# Patient Record
Sex: FEMALE | Race: WHITE | URBAN - METROPOLITAN AREA
[De-identification: names, ages, dates, MRNs, and addresses within clinical notes are randomized per-mention and may not be internally consistent; named-entity substitution may affect disease eponyms.]

---

## 2017-07-10 DIAGNOSIS — F31.9 BIPOLAR I DISORDER (H): ICD-10-CM

## 2017-07-10 NOTE — TELEPHONE ENCOUNTER
FLUoxetine (PROZAC) 20 MG     Last Written Prescription Date: 12/20/16  Last Fill Quantity: 90, # refills: 1  Last Office Visit with [facility] primary care provider:  12/20/16        Last PHQ-9 score on record=   PHQ-9 SCORE 8/15/2016   Total Score 0               lithium (ESKALITH) 450 MG      Last Written Prescription Date: 12/21/16  Last Fill Quantity: 180,  # refills: 1   Last Office Visit with [facility] or [facility] prescribing provider: 12/20/16

## 2017-07-11 RX ORDER — LITHIUM CARBONATE 450 MG
1350 TABLET, EXTENDED RELEASE ORAL DAILY
Qty: 180 TABLET | Refills: 1 | Status: SHIPPED | OUTPATIENT
Start: 2017-07-11 | End: 2017-07-12

## 2017-07-11 NOTE — TELEPHONE ENCOUNTER
Routing refill request to provider for review/approval because:  Drug not on the [facility] refill protocol for dx listed.  Nerissa Xie RN  [facility]  [phone]

## 2017-07-12 ENCOUNTER — TELEPHONE (OUTPATIENT)
Dept: FAMILY MEDICINE | Facility: CLINIC | Age: 42
End: 2017-07-12

## 2017-07-12 DIAGNOSIS — F31.9 BIPOLAR I DISORDER (H): ICD-10-CM

## 2017-07-12 RX ORDER — LITHIUM CARBONATE 450 MG
1350 TABLET, EXTENDED RELEASE ORAL DAILY
Qty: 270 TABLET | Refills: 1 | Status: SHIPPED | OUTPATIENT
Start: 2017-07-12 | End: 2018-10-15

## 2017-07-12 NOTE — TELEPHONE ENCOUNTER
[place] pharmacist at [facility] [phone] calling  wants to clarify the directions for the Lithium- the qty is for 180 but the directions are 3 a day.  This is not a 90 day supply- qty would be 270- just wants to verify if the directions are correct at 3 a day and if they are can we resend the script with a 90 qty      Thank You,    Nerissa GUO RN  [facility]  [phone]

## 2018-02-02 DIAGNOSIS — F31.9 BIPOLAR I DISORDER (H): ICD-10-CM

## 2018-02-02 NOTE — LETTER
[facility] [place]  [address]  [place] [address]  [phone]        February 13, 2018  Aleta Costa  [address] [address]  [place] [address]    Dear Aleta,    I care about your health and have reviewed your health plan. I have reviewed your medical conditions, medication list, and lab results and am making recommendations based on this review, to better manage your health.    You are in particular need of attention regarding:  -Depression    I am recommending that you:  -schedule a FOLLOWUP OFFICE APPOINTMENT     Here is a list of Health Maintenance topics that are due now or due soon:  Health Maintenance Due   Topic Date Due     Flu Vaccine - yearly  09/01/2017       Please call us at [phone] (or use [facility]) to address the above recommendations.     Thank you for trusting [facility] and we appreciate the opportunity to serve you.  We look forward to supporting your healthcare needs in the future.    Healthy Regards,    [facility]:

## 2018-02-02 NOTE — TELEPHONE ENCOUNTER
"Last Written Prescription Date:  7/11/17  Last Fill Quantity: 90,  # refills: 1   Last Office Visit with [facility] provider:  12/20/16   Future Office Visit:       Requested Prescriptions   Pending Prescriptions Disp Refills     FLUoxetine (PROZAC) 20 MG capsule [Pharmacy Med Name: FLUOXETINE HCL 20 MG CAPSULE] 90 capsule 1     Sig: TAKE 1 CAPSULE (20 MG) BY MOUTH DAILY    SSRIs Protocol Failed    2/2/2018  1:28 AM       Failed - Recent or future visit with authorizing provider    Patient had office visit in the last year or has a visit in the next 30 days with authorizing provider.  See \"Patient Info\" tab in inbasket, or \"Choose Columns\" in Meds & Orders section of the refill encounter.            Passed - Patient is age 18 or older       Passed - No active pregnancy on record       Passed - No positive pregnancy test in last 12 months        PHQ-9 SCORE 11/16/2015 8/15/2016   Total Score 6 0     Attempted to call the patient to update her PHQ9.  Pt did not answer and message states \"the mailbox is full and unable to accept new messages\"  Lisa Calixto RN  [facility]      "

## 2018-02-06 NOTE — TELEPHONE ENCOUNTER
Need OV as more than 1 year since last OV on 12/20/2016  Please call and assist with appt.  Thank you    Angeli Clark RN

## 2018-02-09 NOTE — TELEPHONE ENCOUNTER
2nd attempt to call pt.  Mailbox is still full.    Pt is due for an appointment with the provider.    Lisa Weber CMA

## 2018-09-24 ENCOUNTER — TELEPHONE (OUTPATIENT)
Dept: FAMILY MEDICINE | Facility: CLINIC | Age: 43
End: 2018-09-24

## 2018-09-24 DIAGNOSIS — F31.9 BIPOLAR I DISORDER (H): Primary | ICD-10-CM

## 2018-09-24 NOTE — TELEPHONE ENCOUNTER
Per patient, takes lithium and has an appointment scheduled for her physical and med check and states that she usually gets labs done to get her levels checked. She has set up a lab appt and is requesting a lab order so she can complete it.     She also has an abdominal plasty surgery coming up and her surgeon is requesting lab work to be done 7 days prior. She states that she will contact the surgeon requesting the labs to see if she can get an order, but she is asking if Dr. Vega can order those labs if at all possible. She does currently have an appt set.    Outreach ,  [place]

## 2018-09-24 NOTE — TELEPHONE ENCOUNTER
S/w pt and advised PCP did order lithium level to be drawn at a future date.    Pt states has lab appt on 10/8.    Rebecca Arenas RN  EP Triage

## 2018-09-24 NOTE — TELEPHONE ENCOUNTER
Per patient, has reached out to her surgeon and has orders for her labs already. She now only needs labs for her medication lithium levels.    Outreach ,  [place]

## 2018-10-15 ENCOUNTER — OFFICE VISIT (OUTPATIENT)
Dept: FAMILY MEDICINE | Facility: CLINIC | Age: 43
End: 2018-10-15
Payer: COMMERCIAL

## 2018-10-15 VITALS
BODY MASS INDEX: 27.92 KG/M2 | HEART RATE: 76 BPM | DIASTOLIC BLOOD PRESSURE: 63 MMHG | WEIGHT: 173 LBS | SYSTOLIC BLOOD PRESSURE: 90 MMHG | OXYGEN SATURATION: 97 % | TEMPERATURE: 98.4 F

## 2018-10-15 DIAGNOSIS — F31.9 BIPOLAR I DISORDER (H): ICD-10-CM

## 2018-10-15 DIAGNOSIS — Z00.00 ROUTINE HISTORY AND PHYSICAL EXAMINATION OF ADULT: Primary | ICD-10-CM

## 2018-10-15 DIAGNOSIS — Z23 NEED FOR PROPHYLACTIC VACCINATION AND INOCULATION AGAINST INFLUENZA: ICD-10-CM

## 2018-10-15 DIAGNOSIS — L05.91 PILONIDAL CYST: ICD-10-CM

## 2018-10-15 DIAGNOSIS — Z12.31 ENCOUNTER FOR SCREENING MAMMOGRAM FOR BREAST CANCER: ICD-10-CM

## 2018-10-15 LAB
ALBUMIN UR-MCNC: NEGATIVE MG/DL
APPEARANCE UR: NORMAL
BACTERIA #/AREA URNS HPF: ABNORMAL /HPF
BILIRUB UR QL STRIP: NEGATIVE
COLOR UR AUTO: YELLOW
ERYTHROCYTE [DISTWIDTH] IN BLOOD BY AUTOMATED COUNT: 13.8 % (ref 10–15)
GLUCOSE UR STRIP-MCNC: NEGATIVE MG/DL
HCT VFR BLD AUTO: 37.4 % (ref 35–47)
HGB BLD-MCNC: 12.2 G/DL (ref 11.7–15.7)
HGB UR QL STRIP: NEGATIVE
KETONES UR STRIP-MCNC: NEGATIVE MG/DL
LEUKOCYTE ESTERASE UR QL STRIP: NEGATIVE
MCH RBC QN AUTO: 28 PG (ref 26.5–33)
MCHC RBC AUTO-ENTMCNC: 32.6 G/DL (ref 31.5–36.5)
MCV RBC AUTO: 86 FL (ref 78–100)
NITRATE UR QL: NEGATIVE
NON-SQ EPI CELLS #/AREA URNS LPF: ABNORMAL /LPF
PH UR STRIP: 7 PH (ref 5–7)
PLATELET # BLD AUTO: 253 10E9/L (ref 150–450)
RBC # BLD AUTO: 4.36 10E12/L (ref 3.8–5.2)
RBC #/AREA URNS AUTO: ABNORMAL /HPF
SOURCE: NORMAL
SP GR UR STRIP: 1.01 (ref 1–1.03)
UROBILINOGEN UR STRIP-ACNC: 0.2 EU/DL (ref 0.2–1)
WBC # BLD AUTO: 6.2 10E9/L (ref 4–11)
WBC #/AREA URNS AUTO: ABNORMAL /HPF

## 2018-10-15 PROCEDURE — 80061 LIPID PANEL: CPT | Performed by: INTERNAL MEDICINE

## 2018-10-15 PROCEDURE — 80053 COMPREHEN METABOLIC PANEL: CPT | Performed by: INTERNAL MEDICINE

## 2018-10-15 PROCEDURE — G0145 SCR C/V CYTO,THINLAYER,RESCR: HCPCS | Performed by: INTERNAL MEDICINE

## 2018-10-15 PROCEDURE — 90686 IIV4 VACC NO PRSV 0.5 ML IM: CPT | Performed by: INTERNAL MEDICINE

## 2018-10-15 PROCEDURE — 36415 COLL VENOUS BLD VENIPUNCTURE: CPT | Performed by: INTERNAL MEDICINE

## 2018-10-15 PROCEDURE — 99213 OFFICE O/P EST LOW 20 MIN: CPT | Mod: 25 | Performed by: INTERNAL MEDICINE

## 2018-10-15 PROCEDURE — 85027 COMPLETE CBC AUTOMATED: CPT | Performed by: INTERNAL MEDICINE

## 2018-10-15 PROCEDURE — 81001 URINALYSIS AUTO W/SCOPE: CPT | Performed by: INTERNAL MEDICINE

## 2018-10-15 PROCEDURE — 90471 IMMUNIZATION ADMIN: CPT | Performed by: INTERNAL MEDICINE

## 2018-10-15 PROCEDURE — 99396 PREV VISIT EST AGE 40-64: CPT | Mod: 25 | Performed by: INTERNAL MEDICINE

## 2018-10-15 PROCEDURE — 87624 HPV HI-RISK TYP POOLED RSLT: CPT | Performed by: INTERNAL MEDICINE

## 2018-10-15 PROCEDURE — 84443 ASSAY THYROID STIM HORMONE: CPT | Performed by: INTERNAL MEDICINE

## 2018-10-15 RX ORDER — LITHIUM CARBONATE 450 MG
900 TABLET, EXTENDED RELEASE ORAL DAILY
Qty: 270 TABLET | Refills: 1
Start: 2018-10-15 | End: 2019-08-28

## 2018-10-15 RX ORDER — MULTIPLE VITAMINS W/ MINERALS TAB 9MG-400MCG
1 TAB ORAL DAILY
COMMUNITY

## 2018-10-15 NOTE — PROGRESS NOTES
SUBJECTIVE:   CC: Aleta Costa is an 43 year old woman who presents for preventive health visit.     Healthy Habits:    Do you get at least three servings of calcium containing foods daily (dairy, green leafy vegetables, etc.)? No     Amount of exercise or daily activities, outside of work: daily     Problems taking medications regularly no     Medication side effects: Yes  Watery stools , fatigue , very heavy cycle     Have you had an eye exam in the past two years? no    Do you see a dentist twice per year? yes    Do you have sleep apnea, excessive snoring or daytime drowsiness?no    Aleta is a 44 y/o female with Bipolar Disorder. Her mood has been very stable on Lithium and Prozac for several years. She had tried increasing her dose of lithium earlier this year but developed a lot of diarrhea. She has gone back down to 2 tablets (900 mg) and these symptoms have improved. She remembers that her psychiatrist in [place] who initially put her on these medications told her that they can have long-term health implications so she is concerned about this.     Also tells me that she occasionally experiences a cyst at the end of her tailbone. Sometimes gets inflamed and drains. This has bene present since she was a teenager.    Today's PHQ-2 Score: No flowsheet data found.    Abuse: Current or Past(Physical, Sexual or Emotional)- No  Do you feel safe in your environment - Yes    Social History   Substance Use Topics     Smoking status: Never Smoker     Smokeless tobacco: Never Used     Alcohol use No     If you drink alcohol do you typically have >3 drinks per day or >7 drinks per week? No                     Reviewed orders with patient.  Reviewed health maintenance and updated orders accordingly - Yes  BP Readings from Last 3 Encounters:   10/15/18 90/63   12/20/16 100/61   08/15/16 107/58    Wt Readings from Last 3 Encounters:   10/15/18 173 lb (78.5 kg)   12/20/16 175 lb (79.4 kg)   08/15/16 183 lb  (83 kg)                    Patient under age 50, mutual decision reflected in health maintenance.      Pertinent mammograms are reviewed under the imaging tab.  History of abnormal Pap smear: NO - age 30-65 PAP every 5 years with negative HPV co-testing recommended  PAP / HPV Latest Ref Rng & Units 11/16/2015   PAP - NIL   HPV 16 DNA NEG Negative   HPV 18 DNA NEG Negative   OTHER HR HPV NEG Negative     Reviewed and updated as needed this visit by clinical staff         Reviewed and updated as needed this visit by Provider         ROS:   ROS: 10 point ROS neg other than the symptoms noted above in the HPI.    OBJECTIVE:   BP 90/63  Pulse 76  Temp 98.4  F (36.9  C) (Tympanic)  Wt 173 lb (78.5 kg)  LMP 10/02/2018  SpO2 97%  BMI 27.92 kg/m2  EXAM:  GENERAL: healthy, alert and no distress  EYES: Eyes grossly normal to inspection, PERRL and conjunctivae and sclerae normal  HENT: ear canals and TM's normal, nose and mouth without ulcers or lesions  NECK: no adenopathy, no asymmetry, masses, or scars and thyroid normal to palpation  RESP: lungs clear to auscultation - no rales, rhonchi or wheezes  BREAST: normal without masses, tenderness or nipple discharge and no palpable axillary masses or adenopathy  CV: regular rate and rhythm, normal S1 S2, no S3 or S4, no murmur, click or rub, no peripheral edema and peripheral pulses strong  ABDOMEN: soft, nontender, no hepatosplenomegaly, no masses and bowel sounds normal  RECTAL: Pilonidal cyst in the gluteal crest  MS: no gross musculoskeletal defects noted, no edema  SKIN: no suspicious lesions or rashes  NEURO: Normal strength and tone, mentation intact and speech normal  PSYCH: mentation appears normal, affect normal/bright    Diagnostic Test Results:  none     ASSESSMENT/PLAN:   1. Routine history and physical examination of adult  - Pap imaged thin layer screen with HPV - recommended age 30 - 65 years (select HPV order below)  - HPV High Risk Types DNA Cervical  -  "Comprehensive metabolic panel  - CBC with platelets  - Lipid panel reflex to direct LDL Fasting  - Urine Microscopic    2. Bipolar I disorder (H)  Recommending that we continue lithium 900 mg daily since it has worked so well for her as a mood stabilizer.  We will check her thyroid level today as well as a urinalysis and sodium level.  - lithium (ESKALITH) 450 MG CR tablet; Take 2 tablets (900 mg) by mouth daily  Dispense: 270 tablet; Refill: 1  - TSH with free T4 reflex  - *UA reflex to Microscopic and Culture ([facility] (except [place] and [place])    3. Need for prophylactic vaccination and inoculation against influenza  - FLU VACCINE, SPLIT VIRUS, IM (QUADRIVALENT) [56191]- >3 YRS  - Vaccine Administration, Initial [36715]    4. Encounter for screening mammogram for breast cancer  - *MA Screening Digital Bilateral; Future    5. Pilonidal cyst  Discussed etiology and course of pilonidal cyst.  Ultimate treatment involves surgery.  She is not interested in this today but will consider.      COUNSELING:   Reviewed preventive health counseling, as reflected in patient instructions       Regular exercise       Healthy diet/nutrition       Osteoporosis Prevention/Bone Health       Colon cancer screening    BP Readings from Last 1 Encounters:   12/20/16 100/61     Estimated body mass index is 28.25 kg/(m^2) as calculated from the following:    Height as of 8/15/16: 5' 6\" (1.676 m).    Weight as of 12/20/16: 175 lb (79.4 kg).           reports that she has never smoked. She has never used smokeless tobacco.      Counseling Resources:  ATP IV Guidelines  Pooled Cohorts Equation Calculator  Breast Cancer Risk Calculator  FRAX Risk Assessment  ICSI Preventive Guidelines  Dietary Guidelines for Americans, 2010  USDA's MyPlate  ASA Prophylaxis  Lung CA Screening    Licha Vega MD  [facility] [place]    Injectable Influenza Immunization Documentation    1.  Is the person to be vaccinated " sick today?   No    2. Does the person to be vaccinated have an allergy to a component   of the vaccine?   No  Egg Allergy Algorithm Link    3. Has the person to be vaccinated ever had a serious reaction   to influenza vaccine in the past?   No    4. Has the person to be vaccinated ever had Guillain-Barré syndrome?   No    Form completed by Britany Sanchez MA

## 2018-10-15 NOTE — MR AVS SNAPSHOT
After Visit Summary   10/15/2018    Aleta Costa    MRN: [id number]           Patient Information     Date Of Birth          1975        Visit Information        Provider Department      10/15/2018 10:20 AM Licha Vega MD [facility]        Today's Diagnoses     Routine history and physical examination of adult    -  1    Bipolar I disorder (H)        Need for prophylactic vaccination and inoculation against influenza        Encounter for screening mammogram for breast cancer        Pilonidal cyst          Care Instructions      Preventive Health Recommendations  Female Ages 40 to 49    Yearly exam:     See your health care provider every year in order to  1. Review health changes.   2. Discuss preventive care.    3. Review your medicines if your doctor prescribed any.      Get a Pap test every three years (unless you have an abnormal result and your provider advises testing more often).      If you get Pap tests with HPV test, you only need to test every 5 years, unless you have an abnormal result. You do not need a Pap test if your uterus was removed (hysterectomy) and you have not had cancer.      You should be tested each year for STDs (sexually transmitted diseases), if you're at risk.     Ask your doctor if you should have a mammogram.      Have a colonoscopy (test for colon cancer) if someone in your family has had colon cancer or polyps before age 50.       Have a cholesterol test every 5 years.       Have a diabetes test (fasting glucose) after age 45. If you are at risk for diabetes, you should have this test every 3 years.    Shots: Get a flu shot each year. Get a tetanus shot every 10 years.     Nutrition:     Eat at least 5 servings of fruits and vegetables each day.    Eat whole-grain bread, whole-wheat pasta and brown rice instead of white grains and rice.    Get adequate Calcium and Vitamin D.      Lifestyle    Exercise at least 150 minutes a week  (an average of 30 minutes a day, 5 days a week). This will help you control your weight and prevent disease.    Limit alcohol to one drink per day.    No smoking.     Wear sunscreen to prevent skin cancer.    See your dentist every six months for an exam and cleaning.          Follow-ups after your visit        Follow-up notes from your care team     Return in about 6 months (around 4/15/2019) for Medication Follow up.      Your next 10 appointments already scheduled     Oct 17, 2018 10:45 AM CDT   MA SCREENING DIGITAL BILATERAL with ECMA1   [facility]    [address] [place] [address]   [phone]           How do I prepare for my exam? (Food and drink instructions) No Food and Drink Restrictions.  How do I prepare for my exam? (Other instructions) Do not use any powder, lotion or deodorant under your arms or on your breast. If you do, we will ask you to remove it before your exam.  What should I wear: Wear comfortable, two-piece clothing.  How long does the exam take: Most scans will take 15 minutes.  What should I bring: Bring any previous mammograms from other facilities or have them mailed to the breast center.  Do I need a :  No  is needed.  What do I need to tell my doctor: If you have any allergies, tell your care team.  What should I do after the exam: No restrictions, You may resume normal activities.  What is this test: This test is an x-ray of the breast to look for breast disease. The breast is pressed between two plates to flatten and spread the tissue. An X-ray is taken of the breast from different angles.  Who should I call with questions: If you have any questions, please call the Imaging Department where you will have your exam. Directions, parking instructions, and other information is available on our website, [email or web address].  Other information about my exam Three-dimensional (3D) mammograms are  "available at [place] locations in [place], [place], [place], [place], [facility], [place], [facility] and [place].  Health locations include [place] and the [facility] in [place].  Benefits of 3D mammograms include * Improved rate of cancer detection * Decreases your chance of having to go back for more tests, which means fewer: * \"False-positive\" results (This means that there is an abnormal area but it isn't cancer.) * Invasive testing procedures, such as a biopsy or surgery * Can provide clearer images of the breast if you have dense breast tissue.  *3D mammography is an optional exam that anyone can have with a 2D mammogram. It doesn't replace or take the place of a 2D mammogram. 2D mammograms remain an effective screening test for all women.  Not all insurance companies cover the cost of a 3D mammogram. Check with your insurance.            Nov 06, 2018 10:00 AM CST   Pre-Op physical with Oral Davis [facility]-C   [facility] ([address] [address]   [phone]            Nov 06, 2018 10:30 AM CST   LAB with [facility]   [facility] ([address] [address]   [phone]           OUTSIDE LABS: Please include name of facility and Physician that is requesting outside labs be drawn.  Please indicate if labs are fasting or non-fasting on appt notes.  Be as specific as you can on which labs are being drawn.              Future tests that were ordered for you today     Open Future Orders        Priority Expected Expires Ordered    *MA Screening Digital Bilateral Routine  10/15/2019 10/15/2018            Who to contact     If you have questions or need follow up information about today's clinic visit or your schedule please contact [facility] directly at [phone].  Normal or non-critical lab " "and imaging results will be communicated to you by MyChart, letter or phone within 4 business days after the clinic has received the results. If you do not hear from us within 7 days, please contact the clinic through [facility] or phone. If you have a critical or abnormal lab result, we will notify you by phone as soon as possible.  Submit refill requests through [employer] or call your pharmacy and they will forward the refill request to us. Please allow 3 business days for your refill to be completed.          Additional Information About Your Visit        [facility] Information     [employer] lets you send messages to your doctor, view your test results, renew your prescriptions, schedule appointments and more. To sign up, go to [email or web address] . Click on \"Log in\" on the left side of the screen, which will take you to the Welcome page. Then click on \"Sign up Now\" on the right side of the page.     You will be asked to enter the access code listed below, as well as some personal information. Please follow the directions to create your username and password.     Your access code is: [email or web address]  Expires: 2019 10:14 AM     Your access code will  in 90 days. If you need help or a new code, please call your [facility] clinic or [phone].        Care EveryWhere ID     This is your Care EveryWhere ID. This could be used by other organizations to access your [facility] medical records  [id number]        Your Vitals Were     Pulse Temperature Last Period Pulse Oximetry BMI (Body Mass Index)       76 98.4  F (36.9  C) (Tympanic) 10/02/2018 97% 27.92 kg/m2        Blood Pressure from Last 3 Encounters:   10/15/18 90/63   16 100/61   08/15/16 107/58    Weight from Last 3 Encounters:   10/15/18 173 lb (78.5 kg)   16 175 lb (79.4 kg)   08/15/16 183 lb (83 kg)              We Performed the Following     *UA reflex to Microscopic and Culture ([facility] (except [place] and [place])     " CBC with platelets     Comprehensive metabolic panel     FLU VACCINE, SPLIT VIRUS, IM (QUADRIVALENT) [47039]- >3 YRS     HPV High Risk Types DNA Cervical     Lipid panel reflex to direct LDL Fasting     Pap imaged thin layer screen with HPV - recommended age 30 - 65 years (select HPV order below)     TSH with free T4 reflex     Urine Microscopic     Vaccine Administration, Initial [57710]          Today's Medication Changes          These changes are accurate as of 10/15/18  1:17 PM.  If you have any questions, ask your nurse or doctor.               These medicines have changed or have updated prescriptions.        Dose/Directions    lithium 450 MG CR tablet   Commonly known as:  ESKALITH   This may have changed:  how much to take   Used for:  Bipolar I disorder (H)   Changed by:  Licha Vega MD        Dose:  900 mg   Take 2 tablets (900 mg) by mouth daily   Quantity:  270 tablet   Refills:  1            Where to get your medicines      Some of these will need a paper prescription and others can be bought over the counter.  Ask your nurse if you have questions.     You don't need a prescription for these medications     lithium 450 MG CR tablet                Primary Care Provider Office Phone # Fax #    Licha Vega [phone]       [address] [place] [address]        Equal Access to Services     NA PÉREZ AH: Hadclaudy vilao Soomaali, waaxda luqadaha, qaybta kaalmada adeegyada, kallie avendano. So [facility] [phone].    ATENCIÓN: Si habla español, tiene a majano disposición servicios gratuitos de asistencia lingüística. Llame al [phone].    We comply with applicable federal civil rights laws and [place] laws. We do not discriminate on the basis of race, color, national origin, age, disability, sex, sexual orientation, or gender identity.            Thank you!     Thank you for choosing [facility]  for your care.  Our goal is always to provide you with excellent care. Hearing back from our patients is one way we can continue to improve our services. Please take a few minutes to complete the written survey that you may receive in the mail after your visit with us. Thank you!             Your Updated Medication List - Protect others around you: Learn how to safely use, store and throw away your medicines at www.disposemymeds.org.          This list is accurate as of 10/15/18  1:17 PM.  Always use your most recent med list.                   Brand Name Dispense Instructions for use Diagnosis    FLUoxetine 20 MG capsule    PROzac    90 capsule    Take 1 capsule (20 mg) by mouth daily    Bipolar I disorder (H)       lithium 450 MG CR tablet    ESKALITH    270 tablet    Take 2 tablets (900 mg) by mouth daily    Bipolar I disorder (H)       Multi-vitamin Tabs tablet      Take 1 tablet by mouth daily

## 2018-10-15 NOTE — LETTER
October 26, 2018    Aleta Costa  [address] [place] [address]    Dear Aleta,  We are happy to inform you that your PAP smear result from 10/15/18 is normal.  We are now able to do a follow up test on PAP smears. The DNA test is for HPV (Human Papilloma Virus). Cervical cancer is closely linked with certain types of HPV. Your results showed no evidence of high risk HPV.  Therefore we recommend you return in 5 years for your next pap smear and HPV test.  You will still need to return to the clinic every year for an annual exam and other preventive tests.  If you have additional questions regarding this result, please call our registered nurse, Kelly at [phone].  Sincerely,    Licha Vega MD/[facility]

## 2018-10-16 LAB
ALBUMIN SERPL-MCNC: 4 G/DL (ref 3.4–5)
ALP SERPL-CCNC: 44 U/L (ref 40–150)
ALT SERPL W P-5'-P-CCNC: 20 U/L (ref 0–50)
ANION GAP SERPL CALCULATED.3IONS-SCNC: 4 MMOL/L (ref 3–14)
AST SERPL W P-5'-P-CCNC: 12 U/L (ref 0–45)
BILIRUB SERPL-MCNC: 0.4 MG/DL (ref 0.2–1.3)
BUN SERPL-MCNC: 10 MG/DL (ref 7–30)
CALCIUM SERPL-MCNC: 8.4 MG/DL (ref 8.5–10.1)
CHLORIDE SERPL-SCNC: 105 MMOL/L (ref 94–109)
CHOLEST SERPL-MCNC: 208 MG/DL
CO2 SERPL-SCNC: 28 MMOL/L (ref 20–32)
CREAT SERPL-MCNC: 0.69 MG/DL (ref 0.52–1.04)
GFR SERPL CREATININE-BSD FRML MDRD: >90 ML/MIN/1.7M2
GLUCOSE SERPL-MCNC: 95 MG/DL (ref 70–99)
HDLC SERPL-MCNC: 71 MG/DL
LDLC SERPL CALC-MCNC: 123 MG/DL
NONHDLC SERPL-MCNC: 137 MG/DL
POTASSIUM SERPL-SCNC: 3.9 MMOL/L (ref 3.4–5.3)
PROT SERPL-MCNC: 7.1 G/DL (ref 6.8–8.8)
SODIUM SERPL-SCNC: 137 MMOL/L (ref 133–144)
TRIGL SERPL-MCNC: 72 MG/DL
TSH SERPL DL<=0.005 MIU/L-ACNC: 1.03 MU/L (ref 0.4–4)

## 2018-10-17 LAB
COPATH REPORT: NORMAL
PAP: NORMAL

## 2018-10-18 LAB
FINAL DIAGNOSIS: NORMAL
HPV HR 12 DNA CVX QL NAA+PROBE: NEGATIVE
HPV16 DNA SPEC QL NAA+PROBE: NEGATIVE
HPV18 DNA SPEC QL NAA+PROBE: NEGATIVE
SPECIMEN DESCRIPTION: NORMAL
SPECIMEN SOURCE CVX/VAG CYTO: NORMAL

## 2018-11-06 ENCOUNTER — OFFICE VISIT (OUTPATIENT)
Dept: FAMILY MEDICINE | Facility: CLINIC | Age: 43
End: 2018-11-06
Payer: COMMERCIAL

## 2018-11-06 VITALS
OXYGEN SATURATION: 96 % | DIASTOLIC BLOOD PRESSURE: 66 MMHG | HEART RATE: 64 BPM | HEIGHT: 65 IN | WEIGHT: 167 LBS | BODY MASS INDEX: 27.82 KG/M2 | TEMPERATURE: 98.3 F | SYSTOLIC BLOOD PRESSURE: 86 MMHG

## 2018-11-06 DIAGNOSIS — F31.9 BIPOLAR I DISORDER (H): ICD-10-CM

## 2018-11-06 DIAGNOSIS — Z01.818 PREOP GENERAL PHYSICAL EXAM: Primary | ICD-10-CM

## 2018-11-06 LAB — HGB BLD-MCNC: 13.4 G/DL (ref 11.7–15.7)

## 2018-11-06 PROCEDURE — 36415 COLL VENOUS BLD VENIPUNCTURE: CPT | Performed by: PHYSICIAN ASSISTANT

## 2018-11-06 PROCEDURE — 85018 HEMOGLOBIN: CPT | Performed by: PHYSICIAN ASSISTANT

## 2018-11-06 PROCEDURE — 99214 OFFICE O/P EST MOD 30 MIN: CPT | Performed by: PHYSICIAN ASSISTANT

## 2018-11-06 NOTE — PROGRESS NOTES
[facility] [place]  [address] [facility] [address]  [phone]  Dept: [phone]    PRE-OP EVALUATION:  Today's date: 2018    Aleta Costa (: 1975) presents for pre-operative evaluation assessment as requested by Dr. Roshan kruger   She requires evaluation and anesthesia risk assessment prior to undergoing surgery/procedure for treatment of abdominoplasty, lipo, mastopexy     Proposed Surgery/ Procedure: abdominoplasty, lipo, mastopexy   Date of Surgery/ Procedure: 2018  Time of Surgery/ Procedure: 8 am   Hospital/Surgical Facility: [place] [facility]   Fax number for surgical facility: [phone]   Primary Physician: Licha Vega  Type of Anesthesia Anticipated: General    Patient has a Health Care Directive or Living Will:  NO    1. NO - Do you have a history of heart attack, stroke, stent, bypass or surgery on an artery in the head, neck, heart or legs?  2. NO - Do you ever have any pain or discomfort in your chest?  3. NO - Do you have a history of  Heart Failure?  4. NO - Are you troubled by shortness of breath when: walking on the level, up a slight hill or at night?  5. NO - Do you currently have a cold, bronchitis or other respiratory infection?  6. NO - Do you have a cough, shortness of breath or wheezing?  7. NO - Do you sometimes get pains in the calves of your legs when you walk?  8. NO - Do you or anyone in your family have previous history of blood clots?  9. NO - Do you or does anyone in your family have a serious bleeding problem such as prolonged bleeding following surgeries or cuts?  10. NO - Have you ever had problems with anemia or been told to take iron pills?  11. NO - Have you had any abnormal blood loss such as black, tarry or bloody stools, or abnormal vaginal bleeding?  12. NO - Have you ever had a blood transfusion?  13. NO - Have you or any of your relatives ever had problems with anesthesia?  14. NO - Do you have  sleep apnea, excessive snoring or daytime drowsiness?  15. NO - Do you have any prosthetic heart valves?  16. NO - Do you have prosthetic joints?  17. NO - Is there any chance that you may be pregnant?      HPI:     HPI related to upcoming procedure: Aleta presents to the clinic for preoperative examination prior to liposuction, tummy tuck and mastopexy procedure she is having done for cosmetic effect. She is feeling well today, no other concerns.       See problem list for active medical problems.  Problems all longstanding and stable, except as noted/documented.  See ROS for pertinent symptoms related to these conditions.                                                                                                                                                          .    MEDICAL HISTORY:     Patient Active Problem List    Diagnosis Date Noted     Pilonidal cyst 10/15/2018     Priority: Medium     Bipolar I disorder (H) 08/15/2016     Priority: Medium     Bipolar disorder (H) 2015     Priority: Medium      Past Medical History:   Diagnosis Date     Bipolar disorder (H)     psych MD in [place]-still following with him     Colitis due to Clostridium difficile 10/13    during inpt tx for pneumonia     Pneumonia due to Streptococcus (H) 10/13    left     Past Surgical History:   Procedure Laterality Date      SECTION      x3      SECTION, TUBAL LIGATION, COMBINED N/A 2015    Procedure: COMBINED  SECTION, TUBAL LIGATION;  Surgeon: Ilsa Mckinley MD;  Location: [facility] OR     CHOLECYSTECTOMY      24 wks of third preg for acute cholecystitis     CYSTECTOMY OVARIAN BENIGN  3/15/11    after  3rd preg. the ovaries were actually found to be normal but had extensive adhesions and there was an 8cm peritoneal inclusion cyst that was adherent to her left ovary that was removed but not a cyst on the ovaries themselves     DILATION AND CURETTAGE SUCTION  04    for AB 7-8 wks      "TUBAL LIGATION      done at time of 3rd c/s but failed. per outside op report had excision of fimbriated end of tube     Current Outpatient Prescriptions   Medication Sig Dispense Refill     FLUoxetine (PROZAC) 20 MG capsule Take 1 capsule (20 mg) by mouth daily 90 capsule 1     lithium (ESKALITH) 450 MG CR tablet Take 2 tablets (900 mg) by mouth daily 270 tablet 1     multivitamin, therapeutic with minerals (MULTI-VITAMIN) TABS tablet Take 1 tablet by mouth daily       OTC products: None, except as noted above    No Known Allergies   Latex Allergy: NO    Social History   Substance Use Topics     Smoking status: Never Smoker     Smokeless tobacco: Never Used     Alcohol use No     History   Drug Use No       REVIEW OF SYSTEMS:   CONSTITUTIONAL: NEGATIVE for fever, chills, change in weight  INTEGUMENTARY/SKIN: NEGATIVE for worrisome rashes, moles or lesions  EYES: NEGATIVE for vision changes or irritation  ENT/MOUTH: NEGATIVE for ear, mouth and throat problems  RESP: NEGATIVE for significant cough or SOB  BREAST: NEGATIVE for masses, tenderness or discharge  CV: NEGATIVE for chest pain, palpitations or peripheral edema  GI: NEGATIVE for nausea, abdominal pain, heartburn, or change in bowel habits  : NEGATIVE for frequency, dysuria, or hematuria  MUSCULOSKELETAL: NEGATIVE for significant arthralgias or myalgia  NEURO: NEGATIVE for weakness, dizziness or paresthesias  ENDOCRINE: NEGATIVE for temperature intolerance, skin/hair changes  HEME: NEGATIVE for bleeding problems  PSYCHIATRIC: NEGATIVE for changes in mood or affect    EXAM:   Ht 5' 5\" (1.651 m)  Wt 167 lb (75.8 kg)  LMP 10/29/2018  BMI 27.79 kg/m2    GENERAL APPEARANCE: healthy, alert and no distress     EYES: EOMI, PERRL     HENT: ear canals and TM's normal and nose and mouth without ulcers or lesions     NECK: no adenopathy, no asymmetry, masses, or scars and thyroid normal to palpation     RESP: lungs clear to auscultation - no rales, rhonchi or " wheezes     CV: regular rates and rhythm, normal S1 S2, no S3 or S4 and no murmur, click or rub     ABDOMEN:  soft, nontender, no HSM or masses and bowel sounds normal     MS: extremities normal- no gross deformities noted, no evidence of inflammation in joints, FROM in all extremities.     SKIN: no suspicious lesions or rashes     NEURO: Normal strength and tone, sensory exam grossly normal, mentation intact and speech normal     PSYCH: mentation appears normal. and affect normal/bright    DIAGNOSTICS:   Hemoglobin (indicated for history of anemia or procedure with significant blood loss such as tonsillectomy, major intraperitoneal surgery, vascular surgery, major spine surgery, total joint replacement)    Recent Labs   Lab Test  10/15/18   1113  12/20/16   1134  03/24/15   HGB  12.2  13.4   < >   --    PLT  253  237   < >   --    NA  137  138   --    --    POTASSIUM  3.9  4.2   < >   --    CR  0.69  0.66   < >   --    A1C   --    --    --   5.6    < > = values in this interval not displayed.      Results for orders placed or performed in visit on 11/06/18   Hemoglobin   Result Value Ref Range    Hemoglobin 13.4 11.7 - 15.7 g/dL     IMPRESSION:   Reason for surgery/procedure: Cosmetic surgery  iagnosis/reason for consult:  Preoperative examination    The proposed surgical procedure is considered INTERMEDIATE risk.    REVISED CARDIAC RISK INDEX  The patient has the following serious cardiovascular risks for perioperative complications such as (MI, PE, VFib and 3  AV Block):  No serious cardiac risks  INTERPRETATION: 0 risks: Class I (very low risk - 0.4% complication rate)    The patient has the following additional risks for perioperative complications:  No identified additional risks        1. Preop general physical exam  - Hemoglobin    2. Bipolar I disorder (H)  Controlled with Prozac and lithium      RECOMMENDATIONS:           APPROVAL GIVEN to proceed with proposed procedure, without further diagnostic  evaluation       Signed Electronically by: Oral Davis PA-C    Copy of this evaluation report is provided to requesting physician.    [facility] Preop Guidelines    Revised Cardiac Risk Index    Addendum:  I have reviewed the above document, agree with the assessment and plan as outlined.  Optimized for the planned procedure  Aba Pulliam MD  11/6/2018

## 2018-11-06 NOTE — PROGRESS NOTES
Faxed preop report to Hospital/Surgical Facility: [facility]   Fax number for surgical facility: [phone]  on November 6, 2018.  Mabel Gore TC

## 2018-11-06 NOTE — MR AVS SNAPSHOT
After Visit Summary   11/6/2018    Aleta Costa    MRN: [id number]           Patient Information     Date Of Birth          1975        Visit Information        Provider Department      11/6/2018 10:00 AM Oral Davis PA-C [facility]        Today's Diagnoses     Preop general physical exam    -  1      Care Instructions      Before Your Surgery      Call your surgeon if there is any change in your health. This includes signs of a cold or flu (such as a sore throat, runny nose, cough, rash or fever).    Do not smoke, drink alcohol or take over the counter medicine (unless your surgeon or primary care doctor tells you to) for the 24 hours before and after surgery.    If you take prescribed drugs: Follow your doctor s orders about which medicines to take and which to stop until after surgery.    Eating and drinking prior to surgery: follow the instructions from your surgeon    Take a shower or bath the night before surgery. Use the soap your surgeon gave you to gently clean your skin. If you do not have soap from your surgeon, use your regular soap. Do not shave or scrub the surgery site.  Wear clean pajamas and have clean sheets on your bed.           Follow-ups after your visit        Follow-up notes from your care team     Return in about 1 year (around 11/6/2019) for Physical Exam.      Who to contact     If you have questions or need follow up information about today's clinic visit or your schedule please contact [facility] directly at [phone].  Normal or non-critical lab and imaging results will be communicated to you by MyChart, letter or phone within 4 business days after the clinic has received the results. If you do not hear from us within 7 days, please contact the clinic through MyChart or phone. If you have a critical or abnormal lab result, we will notify you by phone as soon as possible.  Submit refill requests through  "MyChart or call your pharmacy and they will forward the refill request to us. Please allow 3 business days for your refill to be completed.          Additional Information About Your Visit        MyChart Information     [employer] lets you send messages to your doctor, view your test results, renew your prescriptions, schedule appointments and more. To sign up, go to [email or web address] . Click on \"Log in\" on the left side of the screen, which will take you to the Welcome page. Then click on \"Sign up Now\" on the right side of the page.     You will be asked to enter the access code listed below, as well as some personal information. Please follow the directions to create your username and password.     Your access code is: [email or web address]  Expires: 2019  9:14 AM     Your access code will  in 90 days. If you need help or a new code, please call your [facility] clinic or [phone].        Care EveryWhere ID     This is your Care EveryWhere ID. This could be used by other organizations to access your [facility] medical records  [id number]        Your Vitals Were     Pulse Temperature Height Last Period Pulse Oximetry BMI (Body Mass Index)    64 98.3  F (36.8  C) (Oral) 5' 5\" (1.651 m) 10/29/2018 96% 27.79 kg/m2       Blood Pressure from Last 3 Encounters:   18 (!) 86/66   10/15/18 90/63   16 100/61    Weight from Last 3 Encounters:   18 167 lb (75.8 kg)   10/15/18 173 lb (78.5 kg)   16 175 lb (79.4 kg)              We Performed the Following     Hemoglobin        Primary Care Provider Office Phone # Fax #    Licha Vega [phone]       [address] [facility] [address]        Equal Access to Services     [facility] BETO AH: Maria Fernanda Arciniega, waaxda luqadaha, qaybta kaalsharlene dunn, kallie sewell . [facility] [phone].    ATENCIÓN: Si habla español, tiene a majano disposición servicios gratuitos de asistencia " lingüística. Jaki al [phone].    We comply with applicable federal civil rights laws and [place] laws. We do not discriminate on the basis of race, color, national origin, age, disability, sex, sexual orientation, or gender identity.            Thank you!     Thank you for choosing [facility]  for your care. Our goal is always to provide you with excellent care. Hearing back from our patients is one way we can continue to improve our services. Please take a few minutes to complete the written survey that you may receive in the mail after your visit with us. Thank you!             Your Updated Medication List - Protect others around you: Learn how to safely use, store and throw away your medicines at www.disposemymeds.org.          This list is accurate as of 11/6/18 10:35 AM.  Always use your most recent med list.                   Brand Name Dispense Instructions for use Diagnosis    FLUoxetine 20 MG capsule    PROzac    90 capsule    Take 1 capsule (20 mg) by mouth daily    Bipolar I disorder (H)       lithium 450 MG CR tablet    ESKALITH    270 tablet    Take 2 tablets (900 mg) by mouth daily    Bipolar I disorder (H)       Multi-vitamin Tabs tablet      Take 1 tablet by mouth daily

## 2018-11-06 NOTE — LETTER
November 6, 2018      Aleta Costa  [address]  [place] [address]        Dear MsGloriaOlivierchristian,    We are writing to inform you of your test results.      -Hemoglobin is normal.  There is no evidence of anemia.    Resulted Orders   Hemoglobin   Result Value Ref Range    Hemoglobin 13.4 11.7 - 15.7 g/dL       If you have any questions or concerns, please call the clinic at the number listed above.       Sincerely,        Oral Davis PA-C

## 2018-11-06 NOTE — NURSING NOTE
preop fax number given was incorrect  TC called and got the correct fax number and refaxed to [phone]  Mabel LAWSON

## 2018-11-07 ENCOUNTER — TELEPHONE (OUTPATIENT)
Dept: FAMILY MEDICINE | Facility: CLINIC | Age: 43
End: 2018-11-07

## 2018-11-07 NOTE — TELEPHONE ENCOUNTER
Triny calling from [facility] regarding low BP from pre op. Given previous 2 BP readings from flow sheet.  Triny states thatr she will discuss with anesthesiologist and call back if any follow up needed.   Yudy Ash RN

## 2019-07-18 ENCOUNTER — TELEPHONE (OUTPATIENT)
Dept: OBGYN | Facility: CLINIC | Age: 44
End: 2019-07-18

## 2019-07-18 NOTE — TELEPHONE ENCOUNTER
Patient called today.    Patient was seen with Ilsa Mckinley MD at the [facility] in 2015.    She would like to re-establish new care and get a physical with this provider again.    Please contact patient.    Thank you.    Central Scheduling  Xiomara DUNCAN

## 2019-08-27 DIAGNOSIS — F31.9 BIPOLAR I DISORDER (H): ICD-10-CM

## 2019-08-27 NOTE — TELEPHONE ENCOUNTER
Reason for Call:  Medication or medication refill:    Do you use a [facility]?  Name of the pharmacy and phone number for the current request:    [id number] - [place], [place] - [address] [facility]. AT [address] OF [facility] [address]         Name of the medication requested: lithium (ESKALITH) 450 MG CR tablet, FLUoxetine (PROZAC) 20 MG capsule    Other request: I made gabrielle incase she need to see the DrGloria In order to refill this Med's. But per pt she need it ASAP since she is out of it.  If no need to see the Dr. pls let the pt know so that she can cancel it.    Can we leave a detailed message on this number? YES    Phone number patient can be reached at: Cell number on file:    Telephone Information:   Mobile [phone]       Best Time: Anytime     Call taken on 8/27/2019 at 4:00 PM by GREGORY ABDI

## 2019-08-28 RX ORDER — LITHIUM CARBONATE 450 MG
900 TABLET, EXTENDED RELEASE ORAL DAILY
Qty: 270 TABLET | Refills: 1 | Status: SHIPPED | OUTPATIENT
Start: 2019-08-28 | End: 2020-06-08

## 2019-08-28 NOTE — TELEPHONE ENCOUNTER
"Requested Prescriptions   Pending Prescriptions Disp Refills     lithium (ESKALITH) 450 MG CR tablet 270 tablet 1     Sig: Take 2 tablets (900 mg) by mouth daily       There is no refill protocol information for this order   Last Written Prescription Date:  10/15/18  Last Fill Quantity: 270,  # refills: 1   Last office visit: 11/6/2018 with prescribing provider:  Oral Davis PA-C   Future Office Visit:   Next 5 appointments (look out 90 days)    Sep 11, 2019  3:40 PM CDT  PHYSICAL with Ilsa Mckinley MD  [facility] ([facility]) [address] [address]  [phone]   Sep 25, 2019  4:00 PM CDT  Office Visit with Licha Vega [place]  [facility] [place] [id number] [phone]              FLUoxetine (PROZAC) 20 MG capsule 90 capsule 1     Sig: Take 1 capsule (20 mg) by mouth daily       SSRIs Protocol Passed - 8/28/2019 11:05 AM        Passed - Recent (12 mo) or future (30 days) visit within the authorizing provider's specialty     Patient had office visit in the last 12 months or has a visit in the next 30 days with authorizing provider or within the authorizing provider's specialty.  See \"Patient Info\" tab in inbasket, or \"Choose Columns\" in Meds & Orders section of the refill encounter.              Passed - Medication is active on med list        Passed - Patient is age 18 or older        Passed - No active pregnancy on record        Passed - No positive pregnancy test in last 12 months        Last Written Prescription Date:  7/11/17  Last Fill Quantity: 90,  # refills: 1   Last office visit: 11/6/2018 with prescribing provider:  Oral Davis PA-C  Routing refill request to provider for review/approval because:  Drug not on the [facility] refill protocol - lithium  A break in medication - fluoxetine  LOV with Dr. Vega was 10/15/18 which is date of last lithium " refill.          Future Office Visit:   Next 5 appointments (look out 90 days)    Sep 11, 2019  3:40 PM CDT  PHYSICAL with Ilsa Mckinley MD  [facility] ([facility]) [address] [place] [id number]  [phone]   Sep 25, 2019  4:00 PM CDT  Office Visit with Licha Vega MD  [facility] [place] [address] [place] [id number]  [phone]

## 2019-09-09 NOTE — PROGRESS NOTES
"Aleta is a 44 year old  female who presents for annual exam.     Besides routine health maintenance, she has no other health concerns today .    HPI:  The patient's PCP is Licha Vega MD.    Patient is doing very well. Hasn't been in since her PP visit 4 yrs ago. Has been seeing Dr. Vega for her PCP and getting labs with her and getting her psych meds from her as well. Just recently had fasting labs 11 months ago and normal.    Hadn't had a mammogram in many years but got one today.    Patient has had a tummy tuck and breast lift since last here. Really hard recovery but now so happy she did it. Feeling stronger than she has in her whole life. Running and doing a marathon coming up in a month or so. Doing a class at [facility] and yoga, meditating, reading a lot, etc. Feeling healthy    Does want to find a therapist. Her son, 14 yrs old and 8th grade, recently had a suicide attempt. Was in patient and now is doing much better, has a great therapist. Lots of guilt that she didn't see it and didn't know he was struggling other than maybe his grades were slipping just a bit but nothing more than that. Thinks talking to someone could really helps. [facility] is going to find someone for her. Has some mild hypomania but not full cam[place]. Mostly just depression at times but nothing recent.    Only issue is having really heavy periods and heavier than they ever have been. Totally regular. No vasomotor sx. Day 2-3 is a super tampon every 1-2 hours, overnight, doesn't want to go anywhere or leave the house. Has gotten progressively worse since having alejandro 4 yrs ago. Doesn't want anything hormonal that could interfere with her moods or her psych meds. Doesn't want to \"take anything\" since has a tubal that failed followed by  needing a vas.    Oldest is 18 and just went to [employer]. Going to nursing program. Son is 14 and next daughter is 9/10 in 4th grade and alejandro is 4. 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      GYNECOLOGIC HISTORY:    Patient's last menstrual period was 2019 (exact date).  Her current contraception method is: vasectomy and tubal ligation.  She  reports that she has never smoked. She has never used smokeless tobacco.    Patient is sexually active.  STD testing offered?  Declined  Last PHQ-9 score on record =   PHQ-9 SCORE 2019   PHQ-9 Total Score 0     Last GAD7 score on record =   DESI-7 SCORE 2019   Total Score 0     Alcohol Score = 1    HEALTH MAINTENANCE:  Cholesterol:   Recent Labs   Lab Test 10/15/18  1113   CHOL 208*   HDL 71   *   TRIG 72   GLUCOSE 95     TSH   Date Value Ref Range Status   10/15/2018 1.03 0.40 - 4.00 mU/L Final      Last Mammo: today,   Pap: HPV: negative  Lab Results   Component Value Date    PAP NIL 10/15/2018    PAP NIL 2015     Colonoscopy:  never, Next Colonoscopy: age 50.  Dexa:  never    Health maintenance updated:  yes    HISTORY:  OB History    Para Term  AB Living   5 4 4 0 1 4   SAB TAB Ectopic Multiple Live Births   1 0 0 0 4      # Outcome Date GA Lbr Kenrick/2nd Weight Sex Delivery Anes PTL Lv   5 Term 09/29/15 38w3d  3.779 kg (8 lb 5.3 oz) F CS-LTranv Gen        Birth Comments: had distal salpingectomy in [place] with her third and conceived. tx care to [facility] during 4th preg after move. planned repeat c/s wt general b/c suspicion for accreta but normal placenta      Name: Ihwot      Apgar1: 6  Apgar5: 9   4 Term 11/15/09 37w0d   F CS-Unspec   ANA      Birth Comments: PTL 34-36 wks and then repeat c/s at 36wks per pt. however notes reviewed and EDC was  and delivered  b/c couldn't stop ctx for 12 hours. had BTL this preg but failed      Name: Paula   3 Term 05 37w0d  3.941 kg (8 lb 11 oz) M CS-Unspec   ANA      Birth Comments: maybe had polyhydramnios with this preg and  question of that leading to PTL in early third trimester. on meds to stop PTL but then full labor at 36 wks so repeat c/s done..per pt. however op note reviewed and EDC was  and delivered  so was 37-38 wk      Name: Sai Gomez Term 01 39w0d  3.742 kg (8 lb 4 oz) F CS-Unspec   ANA      Birth Comments: c/s for arrest of dilation at 5cm after PROM with pit aug for over 4 hrs stalled at 5      Name: Maris Seals SAB                Patient Active Problem List   Diagnosis     Bipolar disorder (H)     Bipolar I disorder (H)     Pilonidal cyst     Past Surgical History:   Procedure Laterality Date     ABDOMINOPLASTY        SECTION      x3      SECTION, TUBAL LIGATION, COMBINED N/A 2015    Procedure: COMBINED  SECTION, TUBAL LIGATION;  Surgeon: Ilsa Mckinley MD;  Location: [facility] OR     CHOLECYSTECTOMY      24 wks of third preg for acute cholecystitis     CYSTECTOMY OVARIAN BENIGN  3/15/11    after  3rd preg. the ovaries were actually found to be normal but had extensive adhesions and there was an 8cm peritoneal inclusion cyst that was adherent to her left ovary that was removed but not a cyst on the ovaries themselves     DILATION AND CURETTAGE SUCTION  04    for AB 7-8 wks     HC REDUCTION OF LARGE BREAST       TUBAL LIGATION      done at time of 3rd c/s but failed. per outside op report had excision of fimbriated end of tube      Social History     Tobacco Use     Smoking status: Never Smoker     Smokeless tobacco: Never Used   Substance Use Topics     Alcohol use: Yes     Alcohol/week: 0.0 oz     Comment: rare      Problem (# of Occurrences) Relation (Name,Age of Onset)    Family History Negative (1) Other            Current Outpatient Medications   Medication Sig     FLUoxetine (PROZAC) 20 MG capsule Take 1 capsule (20 mg) by mouth daily     lithium (ESKALITH) 450 MG CR tablet Take 2 tablets (900 mg) by mouth daily     multivitamin, therapeutic with minerals (MULTI-VITAMIN)  "TABS tablet Take 1 tablet by mouth daily     tranexamic acid (LYSTEDA) 650 MG tablet Take two tabs po TID for up to five days of heavy bleeding     No current facility-administered medications for this visit.      No Known Allergies    Past medical, surgical, social and family histories were reviewed and updated in EPIC.    ROS:   12 point review of systems negative other than symptoms noted below.  Genitourinary: Heavy Bleeding with Period    EXAM:  BP 94/64 (BP Location: Right arm, Patient Position: Sitting, Cuff Size: Adult Regular)   Pulse 64   Ht 1.638 m (5' 4.5\")   Wt 72.4 kg (159 lb 9.6 oz)   LMP 09/03/2019 (Exact Date)   Breastfeeding? No   BMI 26.97 kg/m     BMI: Body mass index is 26.97 kg/m .    PHYSICAL EXAM:  Constitutional:  Appearance: Well nourished, well developed, alert, in no acute distress  Neck:  Lymph Nodes:  No lymphadenopathy present    Thyroid:  Gland size normal, nontender, no nodules or masses present  on palpation  Chest:  Respiratory Effort:  Breathing unlabored  Cardiovascular:    Heart: Auscultation:  Regular rate, normal rhythm, no murmurs present  Breasts: Palpation of Breasts and Axillae:  No masses present on palpation, no breast tenderness., No nodularity, asymmetry or nipple discharge bilaterally. and REDUCTION/LIFT SCARS  Gastrointestinal:   Abdominal Examination:  Abdomen nontender to palpation, tone normal without rigidity or guarding, no masses present, umbilicus without lesions   Liver and Spleen:  No hepatomegaly present, liver nontender to palpation    Hernias:  No hernias present  Lymphatic: Lymph Nodes:  No other lymphadenopathy present  Skin:  General Inspection:  No rashes present, no lesions present, no areas of  discoloration    Genitalia and Groin:  No rashes present, no lesions present, no areas of  discoloration, no masses present  Neurologic/Psychiatric:    Mental Status:  Oriented X3     Pelvic Exam:  External Genitalia:     Normal appearance for age, no " discharge present, no tenderness present, no inflammatory lesions present, color normal  Vagina:     Normal vaginal vault without central or paravaginal defects, no discharge present, no inflammatory lesions present, no masses present  Bladder:     Nontender to palpation  Urethra:   Urethral Body:  Urethra palpation normal, urethra structural support normal   Urethral Meatus:  No erythema or lesions present  Cervix:     Appearance healthy, no lesions present, nontender to palpation, LIGHT MARTINES/BROWN DISCHARGE PRESENT  Uterus:     Uterus: firm, normal sized and nontender, anteverted in position.   Adnexa:     No adnexal tenderness present, no adnexal masses present  Perineum:     Perineum within normal limits, no evidence of trauma, no rashes or skin lesions present  Anus:     Anus within normal limits, no hemorrhoids present  Inguinal Lymph Nodes:     No lymphadenopathy present  Pubic Hair:     Normal pubic hair distribution for age  Genitalia and Groin:     No rashes present, no lesions present, no areas of discoloration, no masses present      COUNSELING:   Reviewed preventive health counseling, as reflected in patient instructions    BMI: Body mass index is 26.97 kg/m .      ASSESSMENT:  44 year old female with satisfactory annual exam.    ICD-10-CM    1. Encounter for gynecological examination without abnormal finding Z01.419    2. Menorrhagia with regular cycle N92.0 tranexamic acid (LYSTEDA) 650 MG tablet   3. Bipolar I disorder (H) F31.9        PLAN:  Pap is UTD for 4 more years as done last year with her PCP  mammo today  Defer labs and other care to Dr. max    Discussed her menorrhagia. Discussed hormonal options like ocps, mirena IUD. Discussed lysteda. Initially really wanted the latter so rx sent in. As discussed mirena IUD in more detail with all the r/b/a, irregular bleeding, amenorrhea rates, rates or expulsion/embedding, complications, patient does think she'd like to consider that. Given info to  read and will likely make an appointment to return and get it placed.    Ilsa Mckinley MD

## 2019-09-11 ENCOUNTER — ANCILLARY PROCEDURE (OUTPATIENT)
Dept: MAMMOGRAPHY | Facility: CLINIC | Age: 44
End: 2019-09-11
Payer: COMMERCIAL

## 2019-09-11 ENCOUNTER — OFFICE VISIT (OUTPATIENT)
Dept: OBGYN | Facility: CLINIC | Age: 44
End: 2019-09-11
Payer: COMMERCIAL

## 2019-09-11 VITALS
BODY MASS INDEX: 26.59 KG/M2 | SYSTOLIC BLOOD PRESSURE: 94 MMHG | WEIGHT: 159.6 LBS | DIASTOLIC BLOOD PRESSURE: 64 MMHG | HEART RATE: 64 BPM | HEIGHT: 65 IN

## 2019-09-11 DIAGNOSIS — Z12.31 VISIT FOR SCREENING MAMMOGRAM: ICD-10-CM

## 2019-09-11 DIAGNOSIS — Z01.419 ENCOUNTER FOR GYNECOLOGICAL EXAMINATION WITHOUT ABNORMAL FINDING: Primary | ICD-10-CM

## 2019-09-11 DIAGNOSIS — N92.0 MENORRHAGIA WITH REGULAR CYCLE: ICD-10-CM

## 2019-09-11 DIAGNOSIS — F31.9 BIPOLAR I DISORDER (H): ICD-10-CM

## 2019-09-11 PROCEDURE — 77067 SCR MAMMO BI INCL CAD: CPT | Mod: TC

## 2019-09-11 PROCEDURE — 99386 PREV VISIT NEW AGE 40-64: CPT | Performed by: OBSTETRICS & GYNECOLOGY

## 2019-09-11 RX ORDER — TRANEXAMIC ACID 650 MG/1
TABLET ORAL
Qty: 30 TABLET | Refills: 5 | Status: SHIPPED | OUTPATIENT
Start: 2019-09-11

## 2019-09-11 ASSESSMENT — MIFFLIN-ST. JEOR: SCORE: 1366.88

## 2019-09-11 ASSESSMENT — ANXIETY QUESTIONNAIRES
6. BECOMING EASILY ANNOYED OR IRRITABLE: NOT AT ALL
2. NOT BEING ABLE TO STOP OR CONTROL WORRYING: NOT AT ALL
3. WORRYING TOO MUCH ABOUT DIFFERENT THINGS: NOT AT ALL
IF YOU CHECKED OFF ANY PROBLEMS ON THIS QUESTIONNAIRE, HOW DIFFICULT HAVE THESE PROBLEMS MADE IT FOR YOU TO DO YOUR WORK, TAKE CARE OF THINGS AT HOME, OR GET ALONG WITH OTHER PEOPLE: NOT DIFFICULT AT ALL
5. BEING SO RESTLESS THAT IT IS HARD TO SIT STILL: NOT AT ALL
7. FEELING AFRAID AS IF SOMETHING AWFUL MIGHT HAPPEN: NOT AT ALL
GAD7 TOTAL SCORE: 0
1. FEELING NERVOUS, ANXIOUS, OR ON EDGE: NOT AT ALL

## 2019-09-11 ASSESSMENT — PATIENT HEALTH QUESTIONNAIRE - PHQ9
SUM OF ALL RESPONSES TO PHQ QUESTIONS 1-9: 0
5. POOR APPETITE OR OVEREATING: NOT AT ALL

## 2019-09-12 ASSESSMENT — ANXIETY QUESTIONNAIRES: GAD7 TOTAL SCORE: 0

## 2020-03-10 ENCOUNTER — HEALTH MAINTENANCE LETTER (OUTPATIENT)
Age: 45
End: 2020-03-10

## 2020-05-31 DIAGNOSIS — F31.9 BIPOLAR I DISORDER (H): ICD-10-CM

## 2020-06-01 NOTE — TELEPHONE ENCOUNTER
Medication is being filled for 1 time refill only due to:  Patient needs to be seen because it has been more than one year since last visit. Patient is scheduled for visit this month, gave small supply of medication.    Baylee Henao RN, BSN  [facility]

## 2020-06-01 NOTE — TELEPHONE ENCOUNTER
Patient is overdue for office visit. Last office visit was November of 2018 with Oral Davis PA-C. Routing to team to inform and assist with scheduling virtual visit with provider. Once patient is scheduled for visit, route back to triage to address refill. Thank you very much.    Baylee Henao RN, BSN  [facility] [place]

## 2020-06-08 ENCOUNTER — VIRTUAL VISIT (OUTPATIENT)
Dept: FAMILY MEDICINE | Facility: CLINIC | Age: 45
End: 2020-06-08
Payer: COMMERCIAL

## 2020-06-08 DIAGNOSIS — Z51.81 ENCOUNTER FOR THERAPEUTIC DRUG MONITORING: Primary | ICD-10-CM

## 2020-06-08 DIAGNOSIS — Z13.1 SCREENING FOR DIABETES MELLITUS: ICD-10-CM

## 2020-06-08 DIAGNOSIS — F31.9 BIPOLAR I DISORDER (H): ICD-10-CM

## 2020-06-08 DIAGNOSIS — Z13.220 SCREENING FOR HYPERLIPIDEMIA: ICD-10-CM

## 2020-06-08 PROCEDURE — 99214 OFFICE O/P EST MOD 30 MIN: CPT | Mod: 95 | Performed by: INTERNAL MEDICINE

## 2020-06-08 RX ORDER — LITHIUM CARBONATE 450 MG
900 TABLET, EXTENDED RELEASE ORAL DAILY
Qty: 270 TABLET | Refills: 1 | Status: SHIPPED | OUTPATIENT
Start: 2020-06-08 | End: 2021-07-21
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
## 2020-11-11 ENCOUNTER — VIRTUAL VISIT (OUTPATIENT)
Dept: FAMILY MEDICINE | Facility: CLINIC | Age: 45
End: 2020-11-11
Payer: COMMERCIAL

## 2020-11-11 DIAGNOSIS — H01.136 EYELID ECZEMA, LEFT: Primary | ICD-10-CM

## 2020-11-11 PROCEDURE — 99213 OFFICE O/P EST LOW 20 MIN: CPT | Mod: 95 | Performed by: FAMILY MEDICINE

## 2020-11-11 RX ORDER — DESONIDE 0.5 MG/G
OINTMENT TOPICAL 2 TIMES DAILY
Qty: 15 G | Refills: 0 | Status: SHIPPED | OUTPATIENT
Start: 2020-11-11 | End: 2022-11-11

## 2020-11-11 NOTE — PROGRESS NOTES
"Aleta Costa is a 45 year old female who is being evaluated via a billable video visit.      The patient has been notified of following:     \"This video visit will be conducted via a call between you and your physician/provider. We have found that certain health care needs can be provided without the need for an in-person physical exam.  This service lets us provide the care you need with a video conversation.  If a prescription is necessary we can send it directly to your pharmacy.  If lab work is needed we can place an order for that and you can then stop by our lab to have the test done at a later time.    Video visits are billed at different rates depending on your insurance coverage.  Please reach out to your insurance provider with any questions.    If during the course of the call the physician/provider feels a video visit is not appropriate, you will not be charged for this service.\"    Patient has given verbal consent for Video visit? Yes  How would you like to obtain your AVS? MyChart  If you are dropped from the video visit, the video invite should be resent to: Text to cell phone: [phone]  Will anyone else be joining your video visit? No      Subjective     Aleta Costa is a 45 year old female who presents today via video visit for the following health issues:    HPI     Eye(s) Problem  Onset/Duration: 1 week  Description:   Location: Left  Pain: no  Redness: YES  Accompanying Signs & Symptoms:  Discharge/mattering: no  Swelling: no dryness on the outer side of eye lid.  Visual changes: no  Fever: no  Nasal Congestion: no  Bothered by bright lights: no  History:  Trauma: no  Foreign body exposure: no  Wearing contacts: no  Precipitating or alleviating factors: None  Therapies tried and outcome: Aquaphor on ey lid        Video Start Time: 2:35    Review of Systems   Constitutional, HEENT, cardiovascular, pulmonary, gi and gu systems are negative, except as otherwise noted.    "   Objective           Vitals:  No vitals were obtained today due to virtual visit.    Physical Exam     GENERAL: Healthy, alert and no distress  EYES: Eyes grossly normal to inspection.  No discharge or erythema, or obvious scleral/conjunctival abnormalities.  NEURO: Cranial nerves grossly intact.  Mentation and speech appropriate for age.  Dryness on the upper eyelid and the corner of the eyes noted.  PSYCH: Mentation appears normal, affect normal/bright, judgement and insight intact, normal speech and appearance well-groomed.            Assessment & Plan     Eyelid eczema, left  Patient is advised to use desonide ointment 2 times a day externally.  Avoid excessive rubbing.  If symptoms does not improve in the next 1 week or any conjunctiva redness or any other symptoms she will follow-up otherwise follow-up as needed  - desonide (DESOWEN) 0.05 % external ointment; Apply topically 2 times daily              Aba Pulliam MD  [facility] [place]      Video-Visit Details    Type of service:  Video Visit    Video End Time:2:40 PM    Originating Location (pt. Location): Home    Distant Location (provider location):  [facility] [place]     Platform used for Video Visit: [employer]

## 2020-12-27 ENCOUNTER — HEALTH MAINTENANCE LETTER (OUTPATIENT)
Age: 45
End: 2020-12-27

## 2021-02-28 ENCOUNTER — HEALTH MAINTENANCE LETTER (OUTPATIENT)
Age: 46
End: 2021-02-28

## 2021-10-03 ENCOUNTER — HEALTH MAINTENANCE LETTER (OUTPATIENT)
Age: 46
End: 2021-10-03

## 2022-01-23 ENCOUNTER — HEALTH MAINTENANCE LETTER (OUTPATIENT)
Age: 47
End: 2022-01-23

## 2022-03-20 ENCOUNTER — HEALTH MAINTENANCE LETTER (OUTPATIENT)
Age: 47
End: 2022-03-20

## 2022-09-11 ENCOUNTER — HEALTH MAINTENANCE LETTER (OUTPATIENT)
Age: 47
End: 2022-09-11

## 2022-11-11 ENCOUNTER — VIRTUAL VISIT (OUTPATIENT)
Dept: FAMILY MEDICINE | Facility: CLINIC | Age: 47
End: 2022-11-11
Payer: COMMERCIAL

## 2022-11-11 DIAGNOSIS — F31.9 BIPOLAR I DISORDER (H): ICD-10-CM

## 2022-11-11 PROCEDURE — 99214 OFFICE O/P EST MOD 30 MIN: CPT | Mod: GT | Performed by: FAMILY MEDICINE

## 2022-11-11 RX ORDER — LITHIUM CARBONATE 450 MG
900 TABLET, EXTENDED RELEASE ORAL DAILY
Qty: 180 TABLET | Refills: 0 | Status: CANCELLED | OUTPATIENT
Start: 2022-11-11

## 2022-11-11 NOTE — PROGRESS NOTES
Aleta is a 47 year old who is being evaluated via a billable video visit.      How would you like to obtain your AVS? MyChart  If the video visit is dropped, the invitation should be resent by: Text to cell phone: [phone]  Will anyone else be joining your video visit? No          Assessment & Plan     Bipolar I disorder (H)  We discussed about this medication these medications are usually required to have periodic blood work including thyroid liver kidney function and levels.  I suggested she needs to get those levels drawn.  Once we review those levels I can send have some limited supply till she see a psychiatrist I suggested these medications are managed by psychiatrist for long-term and she agrees with that.    - FLUoxetine (PROZAC) 20 MG capsule; Take 1 capsule (20 mg) by mouth daily  - Comprehensive metabolic panel; Future  - Lithium level; Future  - TSH; Future  We will send Prozac but will hold lithium till we have the blood work drawn.  He has been out of these medication for few months and have not taken his medication regularly.  956}         No follow-ups on file.    Aba Pulliam MD  [facility]    Subjective   Aleta is a 47 year old, presenting for the following health issues:  No chief complaint on file.      HPI     Medication Followup of Prozac and lithium    Taking Medication as prescribed: NO-Pt does skip some days.    Side Effects:  Dry mouth    Medication Helping Symptoms:  yes    Patient has history of bipolar disorder type I in the past she has been on lithium and Prozac.  Apparently she has been using this medication on and off but for the last few weeks or months she has not taken the medication.  She is not sure who is managing that medication but planning to see a psychiatrist.  She would like to go back on them we do not have any baseline blood work.  Last blood work was almost in 2018.    Review of Systems   Constitutional, HEENT, cardiovascular,  pulmonary, gi and gu systems are negative, except as otherwise noted.      Objective           Vitals:  No vitals were obtained today due to virtual visit.    Physical Exam   GENERAL: Healthy, alert and no distress  EYES: Eyes grossly normal to inspection.  No discharge or erythema, or obvious scleral/conjunctival abnormalities.  RESP: No audible wheeze, cough, or visible cyanosis.  No visible retractions or increased work of breathing.    SKIN: Visible skin clear. No significant rash, abnormal pigmentation or lesions.  NEURO: Cranial nerves grossly intact.  Mentation and speech appropriate for age.  PSYCH: Mentation appears normal, affect normal/bright, judgement and insight intact, normal speech and appearance well-groomed.              Video-Visit Details    Video Start Time: 10:40    Type of service:  Video Visit    Video End Time:11:05 AM    Originating Location (pt. Location): Home        Distant Location (provider location):  On-site    Platform used for Video Visit: [employer]

## 2022-11-23 ENCOUNTER — LAB (OUTPATIENT)
Dept: LAB | Facility: CLINIC | Age: 47
End: 2022-11-23
Payer: COMMERCIAL

## 2022-11-23 DIAGNOSIS — F31.9 BIPOLAR I DISORDER (H): ICD-10-CM

## 2022-11-23 LAB
ALBUMIN SERPL BCG-MCNC: 4.4 G/DL (ref 3.5–5.2)
ALP SERPL-CCNC: 44 U/L (ref 35–104)
ALT SERPL W P-5'-P-CCNC: 15 U/L (ref 10–35)
ANION GAP SERPL CALCULATED.3IONS-SCNC: 10 MMOL/L (ref 7–15)
AST SERPL W P-5'-P-CCNC: 18 U/L (ref 10–35)
BILIRUB SERPL-MCNC: 0.3 MG/DL
BUN SERPL-MCNC: 20.1 MG/DL (ref 6–20)
CALCIUM SERPL-MCNC: 9.1 MG/DL (ref 8.6–10)
CHLORIDE SERPL-SCNC: 104 MMOL/L (ref 98–107)
CREAT SERPL-MCNC: 0.67 MG/DL (ref 0.51–0.95)
DEPRECATED HCO3 PLAS-SCNC: 25 MMOL/L (ref 22–29)
GFR SERPL CREATININE-BSD FRML MDRD: >90 ML/MIN/1.73M2
GLUCOSE SERPL-MCNC: 99 MG/DL (ref 70–99)
LITHIUM SERPL-SCNC: 0.6 MMOL/L (ref 0.6–1.2)
POTASSIUM SERPL-SCNC: 4.2 MMOL/L (ref 3.4–5.3)
PROT SERPL-MCNC: 6.9 G/DL (ref 6.4–8.3)
SODIUM SERPL-SCNC: 139 MMOL/L (ref 136–145)
TSH SERPL DL<=0.005 MIU/L-ACNC: 1.05 UIU/ML (ref 0.3–4.2)

## 2022-11-23 PROCEDURE — 80053 COMPREHEN METABOLIC PANEL: CPT

## 2022-11-23 PROCEDURE — 80178 ASSAY OF LITHIUM: CPT

## 2022-11-23 PROCEDURE — 84443 ASSAY THYROID STIM HORMONE: CPT

## 2022-11-23 PROCEDURE — 36415 COLL VENOUS BLD VENIPUNCTURE: CPT

## 2022-11-25 ENCOUNTER — MYC MEDICAL ADVICE (OUTPATIENT)
Dept: FAMILY MEDICINE | Facility: CLINIC | Age: 47
End: 2022-11-25

## 2022-11-25 DIAGNOSIS — F31.9 BIPOLAR I DISORDER (H): ICD-10-CM

## 2022-11-29 RX ORDER — LITHIUM CARBONATE 450 MG
900 TABLET, EXTENDED RELEASE ORAL DAILY
Qty: 60 TABLET | Refills: 1 | Status: SHIPPED | OUTPATIENT
Start: 2022-11-29

## 2022-11-29 NOTE — TELEPHONE ENCOUNTER
Please see  message and advise. Refill of [place] requested. Pharmacy pended.    Juliana AYON RN  EP Triage

## 2022-11-29 NOTE — CONFIDENTIAL NOTE
Discussed with the patient and confirm she has taken Prozac and lithium in the past together.  We did again discuss about side effects she understand that we will give her 2-month supply till she see her psychiatrist any side effects she needs to report back she understands taking these both medications same time sometime increase the risk of serotonin syndrome she understand that.  However she told me she has taken that in the past together without any side effects.    Also explained to the patient we do due diligence in making sure these medications are prescribed right and if she agree with that we are more than happy to help till she see her psychiatrist.

## 2022-11-29 NOTE — TELEPHONE ENCOUNTER
FYI - Status Update    Who is Calling: patient    Update: Pt called stated that she needs her medication filled as her lab results have been received. (lithium prescriptions)    Does caller want a call/response back: Yes     Could we send this information to you in [facility] or would you prefer to receive a phone call?:   Patient would prefer a phone call   Okay to leave a detailed message?: Yes at Home number on file [phone] (home)

## 2022-11-29 NOTE — TELEPHONE ENCOUNTER
Please see mychart from patient and advise appropriate course of action.      Samanta Kaplan RN  [facility] Triage Nurse

## 2023-01-27 DIAGNOSIS — F31.9 BIPOLAR I DISORDER (H): ICD-10-CM

## 2023-01-27 RX ORDER — LITHIUM CARBONATE 450 MG
900 TABLET, EXTENDED RELEASE ORAL DAILY
Qty: 60 TABLET | Refills: 1 | OUTPATIENT
Start: 2023-01-27

## 2023-02-01 ENCOUNTER — MYC MEDICAL ADVICE (OUTPATIENT)
Dept: FAMILY MEDICINE | Facility: CLINIC | Age: 48
End: 2023-02-01
Payer: COMMERCIAL

## 2023-02-03 NOTE — TELEPHONE ENCOUNTER
Pt called back and stated that she has an appt for next week already to get to med refilled by psych doc. Also advised pt that her mailbox was full as we could not leave a vmGloria Gaviria

## 2023-02-03 NOTE — TELEPHONE ENCOUNTER
Patient Contact    Attempt # 3    Was call answered?  No.  Unable to leave message.    
Need to follow up with psych, short term refill was given  
Patient Contact     Attempt # 1     Was call answered?    No  Mailbox is full, unable to leave a message. [employer] message was sent as well.     On call back:      -Relay message from Dr. Pulliam, see below.    Ruby SAAVEDRA RN  [facility]   
Pt has not read [employer] message     Patient Contact    Attempt # 2    Was call answered?  No. Voicemail is full     Smita DC, Triage RN  [facility]       
no

## 2023-04-30 ENCOUNTER — HEALTH MAINTENANCE LETTER (OUTPATIENT)
Age: 48
End: 2023-04-30

## 2024-07-07 ENCOUNTER — HEALTH MAINTENANCE LETTER (OUTPATIENT)
Age: 49
End: 2024-07-07

## 2025-05-17 ENCOUNTER — HEALTH MAINTENANCE LETTER (OUTPATIENT)
Age: 50
End: 2025-05-17

## 2025-07-19 ENCOUNTER — HEALTH MAINTENANCE LETTER (OUTPATIENT)
Age: 50
End: 2025-07-19